# Patient Record
Sex: FEMALE | Race: WHITE | NOT HISPANIC OR LATINO | Employment: PART TIME | URBAN - METROPOLITAN AREA
[De-identification: names, ages, dates, MRNs, and addresses within clinical notes are randomized per-mention and may not be internally consistent; named-entity substitution may affect disease eponyms.]

---

## 2017-03-19 ENCOUNTER — OFFICE VISIT - HEALTHEAST (OUTPATIENT)
Dept: FAMILY MEDICINE | Facility: CLINIC | Age: 52
End: 2017-03-19

## 2017-03-19 DIAGNOSIS — J10.1 INFLUENZA B: ICD-10-CM

## 2017-03-19 DIAGNOSIS — R68.89 INFLUENZA-LIKE SYMPTOMS: ICD-10-CM

## 2017-03-19 RX ORDER — MULTIPLE VITAMINS W/ MINERALS TAB 9MG-400MCG
1 TAB ORAL DAILY
Status: SHIPPED | COMMUNITY
Start: 2017-03-19

## 2017-03-19 RX ORDER — IBUPROFEN 200 MG
200 TABLET ORAL EVERY 6 HOURS PRN
Status: SHIPPED | COMMUNITY
Start: 2017-03-19

## 2017-03-19 RX ORDER — VALACYCLOVIR HYDROCHLORIDE 500 MG/1
TABLET, FILM COATED ORAL
Refills: 5 | Status: SHIPPED | COMMUNITY
Start: 2017-02-09

## 2021-05-29 ENCOUNTER — RECORDS - HEALTHEAST (OUTPATIENT)
Dept: ADMINISTRATIVE | Facility: CLINIC | Age: 56
End: 2021-05-29

## 2021-05-30 ENCOUNTER — RECORDS - HEALTHEAST (OUTPATIENT)
Dept: ADMINISTRATIVE | Facility: CLINIC | Age: 56
End: 2021-05-30

## 2021-05-30 VITALS — WEIGHT: 154.3 LBS | BODY MASS INDEX: 26.9 KG/M2

## 2021-06-25 NOTE — PROGRESS NOTES
Progress Notes by Efrem Tadeo DO at 3/19/2017 11:50 AM     Author: Efrem Tadeo DO Service: -- Author Type: Physician    Filed: 3/20/2017 12:32 AM Encounter Date: 3/19/2017 Status: Signed    : Efrem Tadeo DO (Physician)       Chief Complaint   Patient presents with   ? Cough     x 4 days   ? Fever     aching, sweating; 101 @ home        History of Present Illness: Nursing notes reviewed. Patient has had cold symptoms with a cough and fever for the past 4 days, up to 102.6. She has a headache and body aches at exam.     Review of systems: See history of present illness, otherwise negative.     Current Outpatient Prescriptions   Medication Sig Dispense Refill   ? CHOLECALCIFEROL, VITAMIN D3, (VITAMIN D3 ORAL) Take by mouth.     ? D-METHORPHAN/PE/ACETAMINOPHEN (THERAFLU MULTI-SYMPTOM COLD ORAL) Take by mouth.     ? GUAIFENESIN/DEXTROMETHORPHAN (ROBITUSSIN DM MAX ORAL) Take by mouth.     ? ibuprofen (ADVIL,MOTRIN) 200 MG tablet Take 200 mg by mouth every 6 (six) hours as needed for pain.     ? multivitamin with minerals (THERA-M) 9 mg iron-400 mcg Tab tablet Take 1 tablet by mouth daily.     ? valACYclovir (VALTREX) 500 MG tablet TK 2 TS PO Q 12 H X 2 DOSES  5     No current facility-administered medications for this visit.        No past medical history on file.   No past surgical history on file.   Social History     Social History   ? Marital status:      Spouse name: N/A   ? Number of children: N/A   ? Years of education: N/A     Social History Main Topics   ? Smoking status: Never Smoker   ? Smokeless tobacco: Never Used   ? Alcohol use None   ? Drug use: None   ? Sexual activity: Not Asked     Other Topics Concern   ? None     Social History Narrative       History   Smoking Status   ? Never Smoker   Smokeless Tobacco   ? Never Used      Exam:   Blood pressure (!) 86/58, pulse 64, temperature 98.1  F (36.7  C), temperature source Oral, resp. rate 14, weight 154 lb 4.8 oz (70 kg), last  menstrual period 03/01/2017, SpO2 98 %.    EXAM:   Wt Readings from Last 3 Encounters:   03/19/17 154 lb 4.8 oz (70 kg)   10/21/15 152 lb 3.2 oz (69 kg)     Temp Readings from Last 3 Encounters:   03/19/17 98.1  F (36.7  C) (Oral)     BP Readings from Last 3 Encounters:   03/19/17 (!) 86/58   10/21/15 116/64     Pulse Readings from Last 3 Encounters:   03/19/17 64   10/21/15 76       General: Vital signs reviewed. It is notable for a lower blood pressure, though patient does not seem symptomatic of this, not complaining of dizziness, and being normally alert and pleasant. Patient is in no acute appearing distress with rare coughing. Breathing is non labored appearing. Patient is oriented x 3.   ENT: Ear exam shows clear TMs with no injection, nasal turbinates are injected and edematous with no rhinorrhea or sinus tenderness, no pharyngeal injection with increased post nasal drainage noted.  Neck: supple with no adenopathy  Heart: Normal rate and rhythm without murmur  Lungs: Clear to auscultation with good air flow bilaterally.  Skin: warm and dry   I explained to patient that given that her influenza symptoms have been present for over three days, starting treatment at this point likely wouldn't be significantly beneficial.  Recent Results (from the past 24 hour(s))   Influenza A/B Rapid Test   Result Value Ref Range    Influenza  A, Rapid Antigen No Influenza A antigen detected No Influenza A antigen detected    Influenza B, Rapid Antigen Influenza B antigen detected (!) No Influenza B antigen detected    Results from exam reviewed with patient.    Assessment/Plan   1. Influenza-like symptoms  Influenza A/B Rapid Test   2. Influenza B         Patient Instructions     Also see info below. Be seen again in 3-4 days if symptoms are not better, sooner if feeling any worse.  Influenza  Influenza (the flu) is an infection that affects your respiratory tract (the mouth, nose, and lungs, and the passages between them).  Unlike a cold, the flu can make you very ill. And it can lead to pneumonia, a serious lung infection. For some people, especially older adults, young children, and people with certain chronic conditions, the flu can have serious complications and even be fatal.  What Are the Risk Factors for the Flu?  Anyone can get the flu. But youre more likely to become infected if you:    Have a weakened immune system.    Work in a health care setting where you may be exposed to flu germs.    Live or work with someone who has the flu.    Havent received an annual flu shot.  How Does the Flu Spread?  The flu is caused by viruses. The viruses spread through the air in droplets when someone who has the flu coughs, sneezes, laughs, or talks. You can become infected when you inhale these viruses directly. You can also become infected when you touch a surface on which the droplets have landed and then transfer the germs to your eyes, nose, or mouth. Touching used tissues, or sharing utensils, drinking glasses, or a toothbrush with an infected person can expose you to flu viruses, too.  What Are the Symptoms of the Flu?  Flu symptoms tend to come on quickly and may last a few days to a few weeks. They include:    Fever usually higher than 101 F  (38.3 C) and chills    Sore throat and headache    Dry cough    Runny nose    Tiredness and weakness    Muscle aches  Factors That Can Make Flu Worse  For some people, the flu can be very serious. The risk of complications is greater for:    Children under age 5.    Adults 65 years of age and older.    People with a chronic illness, such as diabetes or heart, kidney, or lung disease.    People who live in a nursing home or long-term care facility.   How Is the Flu Treated?  Influenza usually improves after 7 days or so. In some cases, your health care provider may prescribe an antiviral medication. This may help you get well sooner. For the medication to help, you need to take it as soon as  possible (ideally within 48 hours) after your symptoms start. If you develop pneumonia or other serious illness, hospital care may be needed.  Easing Flu Symptoms    Drink lots of fluids such as water, juice, and warm soup. A good rule is to drink enough so that you urinate your normal amount.    Get plenty of rest.    Ask your health care provider what to take for fever and pain.    Call your provider if your fever rises over 101 F (38.3 C) or you become dizzy, lightheaded, or short of breath.  Taking Steps to Protect Others    Wash your hands often, especially after coughing or sneezing. Or, clean your hands with an alcohol-based hand  containing at least 60 percent alcohol.    Cough or sneeze into a tissue. Then throw the tissue away and wash your hands. If you dont have a tissue, cough and sneeze into the crook of your elbow.    Stay home until at least 24 hours after you no longer have a fever or chills. Be sure the fever isnt being hidden by fever-reducing medication.    Dont share food, utensils, drinking glasses, or a toothbrush with others.    Ask your health care provider if others in your household should receive antiviral medication to help them avoid infection.  How Can the Flu Be Prevented?    One of the best ways to avoid the flu is to get a flu vaccination each year. Viruses that cause the flu change from year to year. For that reason, doctors recommend getting the flu vaccine each year, as soon as it's available in your area. The vaccine may be given as a shot or as a nasal spray. Your health care provider can tell you which vaccine is right for you.    Wash your hands often. Frequent handwashing is a proven way to help prevent infection.    Carry an alcohol-based hand gel containing at least 60 percent alcohol. Use it when you dont have access to soap and water. Then wash your hands as soon as you can.    Avoid touching your eyes, nose, and mouth.    At home and work, clean phones, computer  keyboards, and toys often with disinfectant wipes.    If possible, avoid close contact with others who have the flu or symptoms of the flu.  Handwashing Tips  Handwashing is one of the best ways to prevent many common infections. If youre caring for or visiting someone with the flu, wash your hands each time you enter and leave the room. Follow these steps:    Use warm water and plenty of soap. Rub your hands together well.    Clean the whole hand, under your nails, between your fingers, and up the wrists.    Wash for at least 15 seconds.    Rinse, letting the water run down your fingers, not up your wrists.    Dry your hands well. Use a paper towel to turn off the faucet and open the door.  Using Alcohol-Based Hand   Alcohol-based hand  are also a good choice. Use them when you dont have access to soap and water. Follow these steps:    Squeeze about a tablespoon of gel into the palm of one hand.    Rub your hands together briskly, cleaning the backs of your hands, the palms, between your fingers, and up the wrists.    Rub until the gel is gone and your hands are completely dry.  Preventing Influenza in Healthcare Settings  The flu is a special concern for people in hospitals and long-term care facilities. To help prevent the spread of flu, many hospitals and nursing homes take these steps:    Health care providers wash their hands or use an alcohol-based hand  before and after treating each patient.    People with the flu have private rooms and bathrooms or share a room with someone with the same infection.    High-risk patients who dont have the flu are encouraged to get the flu and pneumonia vaccines.    All health care workers are encouraged or required to get flu shots.        5266-9014 [employer]. [address] [place] [address], [place] [address]. All rights reserved. This information is not intended as a substitute for professional medical care. Always follow your healthcare  professional's instructions.           Efrem Tadeo DO

## 2021-07-21 ENCOUNTER — RECORDS - HEALTHEAST (OUTPATIENT)
Dept: ADMINISTRATIVE | Facility: CLINIC | Age: 56
End: 2021-07-21

## 2023-11-09 ENCOUNTER — HOSPITAL ENCOUNTER (OUTPATIENT)
Dept: MAMMOGRAPHY | Facility: CLINIC | Age: 58
Discharge: HOME | End: 2023-11-09
Attending: OBSTETRICS & GYNECOLOGY | Admitting: OBSTETRICS & GYNECOLOGY

## 2023-11-09 DIAGNOSIS — Z12.31 VISIT FOR SCREENING MAMMOGRAM: ICD-10-CM

## 2023-11-09 PROCEDURE — 77067 SCR MAMMO BI INCL CAD: CPT

## 2023-12-24 ENCOUNTER — HEALTH MAINTENANCE LETTER (OUTPATIENT)
Age: 58
End: 2023-12-24

## 2025-01-18 ENCOUNTER — HEALTH MAINTENANCE LETTER (OUTPATIENT)
Age: 60
End: 2025-01-18